# Patient Record
Sex: FEMALE | Employment: PART TIME | URBAN - METROPOLITAN AREA
[De-identification: names, ages, dates, MRNs, and addresses within clinical notes are randomized per-mention and may not be internally consistent; named-entity substitution may affect disease eponyms.]

---

## 2019-10-17 ENCOUNTER — HOSPITAL ENCOUNTER (OUTPATIENT)
Dept: MAMMOGRAPHY | Age: 24
Discharge: HOME | End: 2019-10-17

## 2019-10-17 ENCOUNTER — OFFICE VISIT (OUTPATIENT)
Dept: FAMILY PLANNING/WOMEN'S HEALTH CLINIC | Age: 24
End: 2019-10-17

## 2019-10-17 VITALS — DIASTOLIC BLOOD PRESSURE: 79 MMHG | SYSTOLIC BLOOD PRESSURE: 118 MMHG

## 2019-10-17 DIAGNOSIS — N63.0 BREAST LUMP: Primary | ICD-10-CM

## 2019-10-17 PROCEDURE — 76642 ULTRASOUND BREAST LIMITED: CPT

## 2019-10-17 NOTE — PROGRESS NOTES
HISTORY OF PRESENT ILLNESS  Destiney Clemente is a 22 y.o. female here for EWL. HPI Ms. Teresa Matthew, has a lump under her left nipple. She found it about 1 month ago. There has been intermittent white nipple discharge. She was seen at [facility] and subsequently sent here. She has bilateral nipple piercings, placed 9 months ago without issues. She doesn't have periods since getting the Implanon placed. Non-smoker. Review of Systems   Constitutional: Negative. Physical Exam   Constitutional: She is oriented to person, place, and time. She appears well-developed and well-nourished. Pulmonary/Chest: Right breast exhibits no inverted nipple, no mass, no nipple discharge, no skin change and no tenderness. Left breast exhibits mass. Left breast exhibits no inverted nipple, no nipple discharge, no skin change and no tenderness. Breasts are symmetrical.       Lymphadenopathy:     She has no cervical adenopathy. She has no axillary adenopathy. Right: No supraclavicular adenopathy present. Left: No supraclavicular adenopathy present. Neurological: She is alert and oriented to person, place, and time. Skin: Skin is warm and dry. Psychiatric: She has a normal mood and affect. Her behavior is normal. Judgment and thought content normal.   Nursing note and vitals reviewed. ASSESSMENT and PLAN  1. [address]  2. CBE      -left breast lump      -bilateral piercings  3. Diagnostic US today  4. Amenorrhea secondary to Nexplanon  5.  FHX of breast cancer: maternal great aunt and great grandmother

## 2019-10-17 NOTE — PROGRESS NOTES
EVERY WOMANS LIFE HISTORY QUESTIONNAIRE       No Yes Comments   Has a doctor ever seen or felt anything wrong with your breast? []                                  [x]                                  At [address] [place] [address], 9/25/19. Pt felt it first near her left nipple   Have you ever had a breast biopsy? [x]                                  []                                          When and where was last mammogram performed? Has not had, underage    Have you ever been told that there was a problem on your mammogram?   No Yes Comments   []                                  []                                  n/a     Do you have breast implants? No Yes Comments   [x]                                  []                                       When was your last Pap test performed? [facility]    Have you ever had an abnormal Pap test?   No Yes Comments   [x]                                  []                                       Have you had a hysterectomy? No Yes Comments (why)   [x]                                  []                                       Have you been through menopause? No Yes Date of LMP   [x]                                  []                                  No periods due to having Nexplanon     Did your mother take YELENA? No Yes Unknown   []                                  []                                  X     Do you have a history of HIV exposure? No Yes    [x]                                  []                                       Have you ever been diagnosed with any type of Cancer   No Yes Comments (type,when,where,type of treatment   [x]                                  []                                          Has a family member been diagnosed with breast or ovarian cancer?    No Yes Comments (which family members, and type   []                                  [x]                                  Maternal great grandmother with breast cancer at 79 yrs  Maternal great aunt with breast cancer at age 36 and then again at 77 yrs       Are you taking hormone replacement therapy (HRT)     No Yes Comments   [x]                                  []                                       How many times have you been pregnant?     0      Number of live births ? 0    Are you experiencing any of the following? No Yes Comments   Nipple Discharge []                                  [x]                                  White/clear fluid. Does have nipple piercings   Breast Lump/Masses []                                  [x]                                  Under/near nipple , Left breast   Breast Skin Changes [x]                                  []                                          No Yes Comments   Vaginal Discharge [x]                                  []                                     Abnormal/unusual vaginal bleeding [x]                                  []                                         Are you experiencing any other health problems? None        Age at first period?    16  Age at first birth? n/a    Ht-- 5' 2\"    Wt--110 #

## 2019-10-20 NOTE — PROGRESS NOTES
Clinical management:  Please have Ms. Kim Choe return to [facility] in 6 months for a CBE.

## 2019-10-21 ENCOUNTER — TELEPHONE (OUTPATIENT)
Dept: FAMILY PLANNING/WOMEN'S HEALTH CLINIC | Age: 24
End: 2019-10-21

## 2019-10-21 NOTE — TELEPHONE ENCOUNTER
Spoke to pt and gave her Luther Matt NP, recommendation of 6 month f/u breast exam. We will send her a reminder letter for this due April, 2020.

## 2020-05-28 ENCOUNTER — TELEPHONE (OUTPATIENT)
Dept: FAMILY PLANNING/WOMEN'S HEALTH CLINIC | Age: 25
End: 2020-05-28

## 2023-06-28 PROBLEM — F39 MOOD DISORDER (HCC): Status: ACTIVE | Noted: 2023-06-28

## 2024-06-10 ENCOUNTER — OFFICE VISIT (OUTPATIENT)
Age: 29
End: 2024-06-10
Payer: COMMERCIAL

## 2024-06-10 VITALS
DIASTOLIC BLOOD PRESSURE: 80 MMHG | OXYGEN SATURATION: 99 % | BODY MASS INDEX: 24.35 KG/M2 | HEIGHT: 60 IN | HEART RATE: 74 BPM | SYSTOLIC BLOOD PRESSURE: 118 MMHG | WEIGHT: 124 LBS

## 2024-06-10 DIAGNOSIS — R29.2 HYPERREFLEXIA: ICD-10-CM

## 2024-06-10 DIAGNOSIS — R29.898 WEAKNESS OF BOTH ARMS: ICD-10-CM

## 2024-06-10 DIAGNOSIS — R20.8 ELECTRICAL SHOCK SENSATION: Primary | ICD-10-CM

## 2024-06-10 PROCEDURE — 99204 OFFICE O/P NEW MOD 45 MIN: CPT | Performed by: PSYCHIATRY & NEUROLOGY

## 2024-06-10 NOTE — PROGRESS NOTES
Chief Complaint   Patient presents with    Neurologic Problem     Referred by Dr. Vegas to be evaluated for \"electric shock pain in my arms and my legs.\"     Vitals:    06/10/24 0946   BP: 118/80   Pulse: 74   SpO2: 99%

## 2024-06-10 NOTE — PROGRESS NOTES
Chief Complaint   Patient presents with    Neurologic Problem     Referred by Dr. Vegas to be evaluated for \"electric shock pain in my arms and my legs.\"       HISTORY OF PRESENT ILLNESS  Jimy Hannon is a 29 y.o. female who came in for neurological consultation requested by Dr. Turner.  She states for the past at least 20 years or so she has been experiencing electrical/shocklike sensations in all of her extremities that can sometimes be very brief or sometimes will continue for several minutes to several hours.  Her parents were in the [employer] when [place] lived [place] and was not able to see any specialists.  She is now working as a cook at [employer], has her own insurance and is starting to get everything checked out.  She also feels weakness of her arms and at times when lifting a tray, it seems much heavier than it actually is.  Denies any problems with walking, balance.  She was put on gabapentin which seems to be helping with the symptoms.  Routine/basic lab work was completed which was negative except for low vitamin D.  Denies any significant headaches, neck pain, back pain, changes in bladder/bowel function.  No problems with vision, taste, swallowing.  She has reduced sense of smell related to prior sinus/nasal surgery.      Past Medical History:   Diagnosis Date    Anxiety     Chronic pain     Depression     Environmental and seasonal allergies      Current Outpatient Medications   Medication Sig    gabapentin (NEURONTIN) 300 MG capsule Take 1 capsule by mouth 3 times daily for 90 days. Max Daily Amount: 900 mg    medroxyPROGESTERone (DEPO-PROVERA) 150 MG/ML injection INJECT 1 MILLILITER BY INTRAMUSCULAR ROUTE EVERY 3 MONTHS    meloxicam (MOBIC) 15 MG tablet Take 1 tablet by mouth daily (Patient not taking: Reported on 10/13/2023)     No current facility-administered medications for this visit.     Allergies   Allergen Reactions    Amoxicillin Rash    Penicillins Rash     Family History

## 2024-06-11 ENCOUNTER — TELEPHONE (OUTPATIENT)
Age: 29
End: 2024-06-11

## 2024-06-12 ENCOUNTER — TELEPHONE (OUTPATIENT)
Age: 29
End: 2024-06-12

## 2024-06-17 ENCOUNTER — PROCEDURE VISIT (OUTPATIENT)
Age: 29
End: 2024-06-17
Payer: COMMERCIAL

## 2024-06-17 DIAGNOSIS — R20.2 TINGLING SENSATION: Primary | ICD-10-CM

## 2024-06-17 PROCEDURE — 95913 NRV CNDJ TEST 13/> STUDIES: CPT | Performed by: PSYCHIATRY & NEUROLOGY

## 2024-06-17 PROCEDURE — 95886 MUSC TEST DONE W/N TEST COMP: CPT | Performed by: PSYCHIATRY & NEUROLOGY

## 2024-06-17 NOTE — PROGRESS NOTES
[facility]  [facility]  [address] [place] [address], [address]  [place] [address]  Phone [phone] Fax [phone]  Test Date:  2024    Patient: Jimy Hannon : 1995 Physician: Angel Skinner MD   Sex: Female Height: 5' \" Ref Phys: Angel Skinner MD   ID#: [id number] Weight: 124 lbs. Technician: Jairo Bundy     Patient Complaints:  Tingling sensation    Patient History / Exam:  29-year-old female who has been experiencing electrical/shocklike sensation in all extremities for at least 20 years and also notices intermittent weakness of her extremities mainly in the arms.      NCV & EMG Findings:  All nerve conduction studies were within normal limits.  All left vs. right side differences were within normal limits.      All F Wave latencies were within normal limits.  All F Wave left vs. right side latency differences were within normal limits.      All examined muscles (as indicated in the following table) showed no evidence of electrical instability.      Impression:  Normal study.  No evidence to suggest an entrapment mononeuropathy, large fiber peripheral neuropathy or a radiculopathy.    Recommendations:  Consider central workup for patient's symptoms.  Patient is already scheduled for an MRI cervical spine    ___________________________  Angel Skinner MD        Nerve Conduction Studies  Anti Sensory Summary Table     Stim Site NR Peak (ms) Norm Peak (ms) P-T Amp (µV) Norm P-T Amp Onset (ms) Site1 Site2 Delta-P (ms) Dist (cm) Kirby (m/s) Norm Kirby (m/s)   Right Median Anti Sensory (2nd Digit)  32.3 °C   Wrist    3.2 <3.6 29.7 >10 2.1 Wrist 2nd Digit 3.2 14.0 44 >39   Right Radial Anti Sensory (Base 1st Digit)  32.3 °C   Wrist    2.1 <3.1 58.8  1.6 Wrist Base 1st Digit 2.1 0.0     Left Sup Peroneal Anti Sensory (Ant Lat Mall)  31.2 °C   14 cm    2.4 <4.4 10.1 >5.0 1.9 14 cm Ant Lat Mall 2.4 14.0 58 >32   Right Sup Peroneal Anti Sensory (Ant

## 2024-06-26 ENCOUNTER — HOSPITAL ENCOUNTER (OUTPATIENT)
Facility: HOSPITAL | Age: 29
Discharge: HOME | End: 2024-06-29
Attending: PSYCHIATRY & NEUROLOGY
Payer: COMMERCIAL

## 2024-06-26 DIAGNOSIS — R20.8 ELECTRICAL SHOCK SENSATION: ICD-10-CM

## 2024-06-26 DIAGNOSIS — R29.898 WEAKNESS OF BOTH ARMS: ICD-10-CM

## 2024-06-26 DIAGNOSIS — R29.2 HYPERREFLEXIA: ICD-10-CM

## 2024-06-26 PROCEDURE — 6360000004 HC RX CONTRAST MEDICATION: Performed by: PSYCHIATRY & NEUROLOGY

## 2024-06-26 PROCEDURE — 72156 MRI NECK SPINE W/O & W/DYE: CPT

## 2024-06-26 PROCEDURE — A9579 GAD-BASE MR CONTRAST NOS,1ML: HCPCS | Performed by: PSYCHIATRY & NEUROLOGY

## 2024-06-26 RX ADMIN — GADOTERIDOL 10 ML: 279.3 INJECTION, SOLUTION INTRAVENOUS at 15:09

## 2024-07-01 DIAGNOSIS — R29.2 HYPERREFLEXIA: ICD-10-CM

## 2024-07-01 DIAGNOSIS — R20.8 ELECTRICAL SHOCK SENSATION: ICD-10-CM

## 2024-07-01 DIAGNOSIS — R20.2 TINGLING SENSATION: Primary | ICD-10-CM

## 2024-07-17 ENCOUNTER — HOSPITAL ENCOUNTER (OUTPATIENT)
Facility: HOSPITAL | Age: 29
Discharge: HOME | End: 2024-07-20
Attending: PSYCHIATRY & NEUROLOGY
Payer: COMMERCIAL

## 2024-07-17 DIAGNOSIS — R20.2 TINGLING SENSATION: ICD-10-CM

## 2024-07-17 DIAGNOSIS — R20.8 ELECTRICAL SHOCK SENSATION: ICD-10-CM

## 2024-07-17 DIAGNOSIS — R29.2 HYPERREFLEXIA: ICD-10-CM

## 2024-07-17 PROCEDURE — 70551 MRI BRAIN STEM W/O DYE: CPT

## 2024-07-18 ENCOUNTER — TELEPHONE (OUTPATIENT)
Age: 29
End: 2024-07-18

## 2024-07-18 NOTE — TELEPHONE ENCOUNTER
Called patient. Informed her that the doctor reviewed her MRI brain, \"Please inform that brain MRI also came back normal.\"

## 2024-08-14 DIAGNOSIS — G89.29 OTHER CHRONIC PAIN: ICD-10-CM

## 2024-08-15 RX ORDER — GABAPENTIN 600 MG/1
600 TABLET ORAL 3 TIMES DAILY
Qty: 90 TABLET | Refills: 1 | Status: SHIPPED | OUTPATIENT
Start: 2024-08-15 | End: 2024-10-14

## 2024-09-18 ENCOUNTER — OFFICE VISIT (OUTPATIENT)
Age: 29
End: 2024-09-18
Payer: COMMERCIAL

## 2024-09-18 VITALS
HEART RATE: 90 BPM | BODY MASS INDEX: 22.78 KG/M2 | OXYGEN SATURATION: 99 % | SYSTOLIC BLOOD PRESSURE: 122 MMHG | DIASTOLIC BLOOD PRESSURE: 80 MMHG | WEIGHT: 116 LBS | HEIGHT: 60 IN

## 2024-09-18 DIAGNOSIS — R20.8 ELECTRICAL SHOCK SENSATION: Primary | ICD-10-CM

## 2024-09-18 DIAGNOSIS — R29.898 WEAKNESS OF BOTH ARMS: ICD-10-CM

## 2024-09-18 DIAGNOSIS — R42 LIGHTHEADED: ICD-10-CM

## 2024-09-18 DIAGNOSIS — M79.7 FIBROMYALGIA: ICD-10-CM

## 2024-09-18 PROCEDURE — 99214 OFFICE O/P EST MOD 30 MIN: CPT | Performed by: PSYCHIATRY & NEUROLOGY

## 2024-10-30 ENCOUNTER — TELEPHONE (OUTPATIENT)
Age: 29
End: 2024-10-30

## 2024-10-30 NOTE — TELEPHONE ENCOUNTER
HIPAA Verified (if caller is someone other than patient):  Patient        Reason for Call: Medication refill     Medication Name:   GABAPENTIN    Pharmacy (if different from pharmacy on file):   [facility]    Prescribing Provider:   Bro[facility]     Last Office Visit (must be within last 12 months - if not, schedule appointment then send refill encounter):   LOV: 9/18/24     Is patient completely out of medication?   Yes        Message: (any additional details from patient/caller not covered above)  N/A

## 2024-10-31 DIAGNOSIS — G89.29 OTHER CHRONIC PAIN: ICD-10-CM

## 2024-10-31 RX ORDER — GABAPENTIN 600 MG/1
600 TABLET ORAL 3 TIMES DAILY
Qty: 90 TABLET | Refills: 1 | Status: SHIPPED | OUTPATIENT
Start: 2024-10-31 | End: 2024-12-30

## 2025-01-24 ENCOUNTER — TELEPHONE (OUTPATIENT)
Age: 30
End: 2025-01-24

## 2025-01-24 NOTE — TELEPHONE ENCOUNTER
Patient is requesting a refill of gabapentin to be sent to the [facility] on file.

## 2025-01-27 DIAGNOSIS — G89.29 OTHER CHRONIC PAIN: ICD-10-CM

## 2025-01-27 RX ORDER — GABAPENTIN 600 MG/1
600 TABLET ORAL 3 TIMES DAILY
Qty: 90 TABLET | Refills: 1 | Status: SHIPPED | OUTPATIENT
Start: 2025-01-27 | End: 2025-03-28

## 2025-03-06 NOTE — PROGRESS NOTES
billed procedures.     Return for keep appointment with other provider as scheduled.   RACHEAL CRUZ - NP

## 2025-03-07 ENCOUNTER — OFFICE VISIT (OUTPATIENT)
Age: 30
End: 2025-03-07
Payer: COMMERCIAL

## 2025-03-07 VITALS
RESPIRATION RATE: 16 BRPM | SYSTOLIC BLOOD PRESSURE: 122 MMHG | DIASTOLIC BLOOD PRESSURE: 74 MMHG | WEIGHT: 115 LBS | HEART RATE: 113 BPM | BODY MASS INDEX: 22.58 KG/M2 | HEIGHT: 60 IN | OXYGEN SATURATION: 98 %

## 2025-03-07 DIAGNOSIS — R29.898 WEAKNESS OF BOTH LEGS: ICD-10-CM

## 2025-03-07 DIAGNOSIS — R20.8 ELECTRICAL SHOCK SENSATION: Primary | ICD-10-CM

## 2025-03-07 PROCEDURE — 99215 OFFICE O/P EST HI 40 MIN: CPT

## 2025-03-07 RX ORDER — CYCLOBENZAPRINE HCL 10 MG
10 TABLET ORAL AS NEEDED
COMMUNITY
Start: 2024-10-11

## 2025-03-07 ASSESSMENT — PATIENT HEALTH QUESTIONNAIRE - PHQ9
1. LITTLE INTEREST OR PLEASURE IN DOING THINGS: NOT AT ALL
SUM OF ALL RESPONSES TO PHQ QUESTIONS 1-9: 0
SUM OF ALL RESPONSES TO PHQ QUESTIONS 1-9: 0
2. FEELING DOWN, DEPRESSED OR HOPELESS: NOT AT ALL
SUM OF ALL RESPONSES TO PHQ QUESTIONS 1-9: 0
SUM OF ALL RESPONSES TO PHQ QUESTIONS 1-9: 0

## 2025-03-28 ENCOUNTER — TELEPHONE (OUTPATIENT)
Age: 30
End: 2025-03-28

## 2025-04-01 ENCOUNTER — TELEPHONE (OUTPATIENT)
Age: 30
End: 2025-04-01

## 2025-04-01 NOTE — TELEPHONE ENCOUNTER
Patient called to request a refill of gabapentin. Patient would like to go up a level in dosage.    Patient is also requesting a refill of DULoxetine and would like to \"bump up to the next level in dosage.\"    Patient is asking for this to be sent to the [facility] on file.

## 2025-04-02 DIAGNOSIS — G89.29 OTHER CHRONIC PAIN: ICD-10-CM

## 2025-04-02 NOTE — TELEPHONE ENCOUNTER
Called patient. Informed her of the NP's response. Patient stated that gabapentin was working at first but now it is wearing off and not working as it used too. Stated it may be because since it is warm out, she has been outside and been active/busy.

## 2025-04-03 ENCOUNTER — TELEPHONE (OUTPATIENT)
Age: 30
End: 2025-04-03

## 2025-04-03 NOTE — TELEPHONE ENCOUNTER
No pa required for Ans testing [id number]    Hold prior to testing   Gabapentin-3 days  Duloxetine-5 days  Cyclobenzaprine-3 days

## 2025-04-03 NOTE — TELEPHONE ENCOUNTER
Called patient and given her the NP's response. Her PCP actually increased her cymbalta today at 60mg once daily and kept her gabapentin dosage the same. Informed her of the Dr. Gunter appt that we will need to get that rescheduled under [facility] instead.

## 2025-04-25 ENCOUNTER — PROCEDURE VISIT (OUTPATIENT)
Age: 30
End: 2025-04-25

## 2025-04-25 DIAGNOSIS — R20.8 ELECTRICAL SHOCK SENSATION: ICD-10-CM

## 2025-04-25 DIAGNOSIS — R42 LIGHTHEADED: ICD-10-CM

## 2025-04-25 DIAGNOSIS — R20.2 PARESTHESIA: Primary | ICD-10-CM

## 2025-04-25 NOTE — PROGRESS NOTES
[facility]  [address], [address]  [place] [address]  Phone: [phone]  FAX: [phone]    Patient ID:  Jimy Hannon  [id number]  30 y.o.  1995     REFERRED BY: Angel Skinner MD  PCP: Lore Turner MD    Date of Testin2025       Indication/History:    Shock like paresthesias across patient's body (+) lightheadedness. (+) anxiety/depression; Fibromyaglai    Patient is coming for syncope/autonomic dysfunction evaluation.    Medications taken 48 hrs before the test: Mobic     Procedure: This Autonomic Nervous System (ANS) testing is performed by utilizing [facility] Work Autonomic System, with established protocol.  Multiple procedures performed: Heart rate response to deep breathing (HRDB), Valsalva ratio, Heart rate and BP response to head up tilt (HUT), and Quantitative sudomotor axon reflex testing (QSWEAT) .     Result:  Heart response to deep  breathing (HRDB): 2 series of 6 cycles were performed and the mean of 6 consecutive cycles was obtained. Average HR difference was 11.52 and E:I ratio was 1.12.    Heart rate response to Valsalva maneuver:  [id number] BP to Valsalva was measured and BP response in all 4 phases was normal. Heart rate response was the opposite of BP, a normal response. ( VR = 2.13 )  HUT (head-up tilt) : [id number] BP and HR were measured, up to 15 minutes post tilt.  No significant BP reduction or sustained HR acceleration/deceleration.  SUDOMOTOR: QSWEAT response showed relatively preserved sweat production in all 4 localities (forearm, proximal leg, distal leg, foot) of the right upper and lower limbs, comparing patient to age group.     Impression:   NORMAL    Relatively preserved autonomic function for this age with normal sweat responses.         Mitesh Gunter MD  Diplomate, American Board of Psychiatry and Neurology  Diplomate, Neuromuscular Medicine  Diplomate, American Board of Electrodiagnostic

## 2025-04-29 ENCOUNTER — TELEPHONE (OUTPATIENT)
Age: 30
End: 2025-04-29

## 2025-08-20 ENCOUNTER — OFFICE VISIT (OUTPATIENT)
Age: 30
End: 2025-08-20
Payer: COMMERCIAL

## 2025-08-20 VITALS
RESPIRATION RATE: 16 BRPM | SYSTOLIC BLOOD PRESSURE: 122 MMHG | HEIGHT: 63 IN | HEART RATE: 106 BPM | DIASTOLIC BLOOD PRESSURE: 88 MMHG | BODY MASS INDEX: 23.92 KG/M2 | OXYGEN SATURATION: 98 % | WEIGHT: 135 LBS

## 2025-08-20 DIAGNOSIS — R00.0 TACHYCARDIA: ICD-10-CM

## 2025-08-20 DIAGNOSIS — R20.8 ELECTRICAL SHOCK SENSATION: Primary | ICD-10-CM

## 2025-08-20 DIAGNOSIS — R10.32 LEFT GROIN PAIN: ICD-10-CM

## 2025-08-20 PROCEDURE — 99215 OFFICE O/P EST HI 40 MIN: CPT

## 2025-08-20 RX ORDER — DICLOFENAC SODIUM 75 MG/1
75 TABLET, DELAYED RELEASE ORAL 2 TIMES DAILY
COMMUNITY
Start: 2025-07-30

## 2025-08-20 ASSESSMENT — PATIENT HEALTH QUESTIONNAIRE - PHQ9
SUM OF ALL RESPONSES TO PHQ QUESTIONS 1-9: 0
1. LITTLE INTEREST OR PLEASURE IN DOING THINGS: NOT AT ALL
SUM OF ALL RESPONSES TO PHQ QUESTIONS 1-9: 0
2. FEELING DOWN, DEPRESSED OR HOPELESS: NOT AT ALL

## 2025-08-20 ASSESSMENT — ENCOUNTER SYMPTOMS: PHOTOPHOBIA: 1

## 2025-08-20 ASSESSMENT — VISUAL ACUITY: VA_NORMAL: 1

## 2025-09-04 DIAGNOSIS — G43.809 MIGRAINE VARIANT: Primary | ICD-10-CM
